# Patient Record
Sex: FEMALE | Race: WHITE | NOT HISPANIC OR LATINO | Employment: FULL TIME | URBAN - METROPOLITAN AREA
[De-identification: names, ages, dates, MRNs, and addresses within clinical notes are randomized per-mention and may not be internally consistent; named-entity substitution may affect disease eponyms.]

---

## 2017-02-24 ENCOUNTER — OFFICE VISIT (OUTPATIENT)
Dept: URGENT CARE | Facility: PHYSICIAN GROUP | Age: 27
End: 2017-02-24
Payer: COMMERCIAL

## 2017-02-24 VITALS
OXYGEN SATURATION: 95 % | RESPIRATION RATE: 16 BRPM | BODY MASS INDEX: 27.21 KG/M2 | DIASTOLIC BLOOD PRESSURE: 78 MMHG | TEMPERATURE: 98.2 F | WEIGHT: 135 LBS | HEIGHT: 59 IN | HEART RATE: 88 BPM | SYSTOLIC BLOOD PRESSURE: 116 MMHG

## 2017-02-24 DIAGNOSIS — J04.0 LARYNGITIS: ICD-10-CM

## 2017-02-24 DIAGNOSIS — R05.9 COUGH: ICD-10-CM

## 2017-02-24 PROCEDURE — 99204 OFFICE O/P NEW MOD 45 MIN: CPT | Performed by: PHYSICIAN ASSISTANT

## 2017-02-24 RX ORDER — BENZONATATE 100 MG/1
200 CAPSULE ORAL 3 TIMES DAILY PRN
Qty: 30 CAP | Refills: 0 | Status: ON HOLD | OUTPATIENT
Start: 2017-02-24 | End: 2018-02-23

## 2017-02-24 RX ORDER — METHYLPREDNISOLONE 4 MG/1
TABLET ORAL
Qty: 1 KIT | Refills: 0 | Status: ON HOLD | OUTPATIENT
Start: 2017-02-24 | End: 2018-02-23

## 2017-02-24 ASSESSMENT — ENCOUNTER SYMPTOMS
SHORTNESS OF BREATH: 0
NECK PAIN: 0
ABDOMINAL PAIN: 0
MYALGIAS: 0
WHEEZING: 1
EYE REDNESS: 0
HEADACHES: 1
COUGH: 1
SORE THROAT: 0
DIARRHEA: 0
EYE DISCHARGE: 0
CHILLS: 0
FEVER: 0
SPUTUM PRODUCTION: 0

## 2017-02-24 ASSESSMENT — PATIENT HEALTH QUESTIONNAIRE - PHQ9: CLINICAL INTERPRETATION OF PHQ2 SCORE: 0

## 2017-02-24 NOTE — PROGRESS NOTES
"Subjective:      Paola Covington is a 26 y.o. female who presents with Cough            Cough  This is a new problem. Episode onset: 6 days ago. The problem has been waxing and waning. The problem occurs hourly. The cough is non-productive. Associated symptoms include headaches and wheezing. Pertinent negatives include no chest pain, chills, ear congestion, ear pain, eye redness, fever, myalgias, nasal congestion, postnasal drip, rash, sore throat or shortness of breath. Associated symptoms comments: Pos. For voice changes.   . The symptoms are aggravated by cold air. Treatments tried: Tea. The treatment provided mild relief. Her past medical history is significant for asthma.   Pt. Reports that 3 days ago she began to have voice changes, and then lost it yesterday. She denies any soreness, only scratchy throat.     Review of Systems   Constitutional: Negative for fever, chills and malaise/fatigue.   HENT: Negative for congestion, ear pain, postnasal drip and sore throat.    Eyes: Negative for discharge and redness.   Respiratory: Positive for cough and wheezing. Negative for sputum production and shortness of breath.    Cardiovascular: Negative for chest pain and leg swelling.   Gastrointestinal: Negative for abdominal pain and diarrhea.   Genitourinary: Negative for dysuria and urgency.   Musculoskeletal: Negative for myalgias and neck pain.   Skin: Negative for itching and rash.   Neurological: Positive for headaches.          Objective:     /78 mmHg  Pulse 88  Temp(Src) 36.8 °C (98.2 °F)  Resp 16  Ht 1.499 m (4' 11.02\")  Wt 61.236 kg (135 lb)  BMI 27.25 kg/m2  SpO2 95%  LMP 02/17/2017  Breastfeeding? No   PMH:  has a past medical history of ASTHMA and Anxiety. She also has no past medical history of Addisons disease (CMS-[facility]), Adrenal disorder (CMS-[facility]), Allergy, Anemia, Arrhythmia, Arthritis, Blood transfusion, Cancer (CMS-[facility]), CATARACT, CHF (congestive heart failure) (CMS-HCC), Clotting " disorder (CMS-[facility]), COPD, Cushings syndrome (CMS-[facility]), Depression, Diabetes, Diabetic neuropathy (CMS-[facility]), EMPHYSEMA, GERD (gastroesophageal reflux disease), Glaucoma, Goiter, Headache(784.0), Heart attack (CMS-[facility]), Heart murmur, HIV (human immunodeficiency virus infection), Hyperlipidemia, Parathyroid disorder (CMS-[facility]), Hypertension, IBD (inflammatory bowel disease), Meningitis, Kidney disease, Migraine, Urinary tract infection, site not specified, Tuberculosis, Ulcer (CMS-[facility]), Thyroid disease, Substance abuse, Stroke (CMS-[facility]), Sickle cell disease (CMS-[facility]), Seizure (CMS-[facility]), Pituitary disease (CMS-[facility]), OSTEOPOROSIS, or Muscle disorder.  MEDS:   Current outpatient prescriptions:   •  MethylPREDNISolone (MEDROL DOSEPAK) 4 MG Tablet Therapy Pack, UAD, Disp: 1 Kit, Rfl: 0  •  benzonatate (TESSALON) 100 MG Cap, Take 2 Caps by mouth 3 times a day as needed for Cough., Disp: 30 Cap, Rfl: 0  •  benzonatate (TESSALON) 100 MG Cap, Take 2 Caps by mouth 3 times a day as needed for Cough., Disp: 30 Cap, Rfl: 0  •  MethylPREDNISolone (MEDROL DOSEPAK) 4 MG Tablet Therapy Pack, UAD, Disp: 1 Kit, Rfl: 0  •  ibuprofen (MOTRIN) 600 MG Tab, Take 1 Tab by mouth every 6 hours as needed for Moderate Pain., Disp: 30 Tab, Rfl: 0  •  acetaminophen (TYLENOL) 325 MG Tab, Take 325 mg by mouth 2 times a day as needed., Disp: , Rfl:   •  ferrous sulfate 325 (65 FE) MG tablet, Take 1 Tab by mouth 2 Times a Day., Disp: 60 Tab, Rfl: 0  •  Ferrous Gluconate 246 (28 FE) MG Tab, Take 2 tablet by mouth 2 Times a Day., Disp: 30 Tab, Rfl: 3  ALLERGIES: No Known Allergies  SURGHX:   Past Surgical History   Procedure Laterality Date   • Dilation and evacuation  7/16/2012     Performed by FLORI CHAN at SURGERY [facility]     SOCHX:  reports that she has never smoked. She does not have any smokeless tobacco history on file. She reports that she drinks alcohol. She reports that she does not use illicit drugs.  FH: Family history was  reviewed, no pertinent findings to report    Physical Exam   Constitutional: She is oriented to person, place, and time. She appears well-developed and well-nourished.   HENT:   Head: Normocephalic and atraumatic.   Right Ear: External ear normal.   Left Ear: External ear normal.   Nose: Nose normal.   Mouth/Throat: No oropharyngeal exudate.   Eyes: EOM are normal. Pupils are equal, round, and reactive to light.   Neck: Normal range of motion. Neck supple.   Cardiovascular: Normal rate and regular rhythm.    No murmur heard.  Pulmonary/Chest: Effort normal. She has wheezes.   Insp. Wheezing throughout.    Musculoskeletal: Normal range of motion. She exhibits no tenderness.   Lymphadenopathy:     She has no cervical adenopathy.   Neurological: She is alert and oriented to person, place, and time.   Skin: Skin is warm. No rash noted.   Psychiatric: She has a normal mood and affect. Her behavior is normal.   Vitals reviewed.              Assessment/Plan:     1. Laryngitis  2. Cough  - MethylPREDNISolone (MEDROL DOSEPAK) 4 MG Tablet Therapy Pack; UAD  Dispense: 1 Kit; Refill: 0  - benzonatate (TESSALON) 100 MG Cap; Take 2 Caps by mouth 3 times a day as needed for Cough.  Dispense: 30 Cap; Refill: 0    Pt declined albuterol today- she did not have significant improvement in the past with such. Will trial steroid to assist with wheezing and cough.   Discussed viral nature of symptoms today. Encouraged fluids, avoid night time dairy.   Patient given precautionary s/sx that mandate immediate follow up and evaluation in the ED. Advised of risks of not doing so.    DDX, Supportive care, and indications for immediate follow-up discussed with patient.    Instructed to return to clinic or nearest emergency department if we are not available for any change in condition, further concerns, or worsening of symptoms.    The patient demonstrated a good understanding and agreed with the treatment plan.

## 2017-02-24 NOTE — MR AVS SNAPSHOT
"        Paola Adria   2017 12:40 PM   Office Visit   MRN: [id number]    Department:  [facility]   Dept Phone:  [phone]    Description:  Female : 1990   Provider:  Dimitry Ponce PA-C           Reason for Visit     Cough x6 days, loss of voice x3 days      Allergies as of 2017     No Known Allergies      You were diagnosed with     Laryngitis   [453331]       Cough   [786.2.ICD-9-CM]         Vital Signs     Blood Pressure Pulse Temperature Respirations Height Weight    116/78 mmHg 88 36.8 °C (98.2 °F) 16 1.499 m (4' 11.02\") 61.236 kg (135 lb)    Body Mass Index Oxygen Saturation Last Menstrual Period Breastfeeding? Smoking Status       27.25 kg/m2 95% 2017 No Never Smoker        Basic Information     Date Of Birth Sex Race Ethnicity Preferred Language    1990 Female White Non- English      Problem List              ICD-10-CM Priority Class Noted - Resolved    Routine postpartum follow-up Z39.2   2012 - Present    Threatened  in second trimester O20.0   2016 - Present      Health Maintenance        Date Due Completion Dates    IMM HEP B VACCINE (1 of 3 - Primary Series) 1990 ---    IMM HEP A VACCINE (1 of 2 - Standard Series) 1991 ---    IMM HPV VACCINE (1 of 3 - Female 3 Dose Series) 2001 ---    IMM VARICELLA (CHICKENPOX) VACCINE (1 of 2 - 2 Dose Adolescent Series) 2003 ---    PAP SMEAR 2014    IMM INFLUENZA (1) 2016 ---    IMM DTaP/Tdap/Td Vaccine (2 - Td) 2022            Current Immunizations     Tdap Vaccine 2012  6:15 AM      Below and/or attached are the medications your provider expects you to take. Review all of your home medications and newly ordered medications with your provider and/or pharmacist. Follow medication instructions as directed by your provider and/or pharmacist. Please keep your medication list with you and share with your provider. Update the information when " medications are discontinued, doses are changed, or new medications (including over-the-counter products) are added; and carry medication information at all times in the event of emergency situations     Allergies:  No Known Allergies          Medications  Valid as of: February 24, 2017 -  1:26 PM    Generic Name Brand Name Tablet Size Instructions for use    Acetaminophen (Tab) TYLENOL 325 MG Take 325 mg by mouth 2 times a day as needed.        Benzonatate (Cap) TESSALON 100 MG Take 2 Caps by mouth 3 times a day as needed for Cough.        Benzonatate (Cap) TESSALON 100 MG Take 2 Caps by mouth 3 times a day as needed for Cough.        Ferrous Gluconate (Tab) Ferrous Gluconate 246 (28 FE) MG Take 2 tablet by mouth 2 Times a Day.        Ferrous Sulfate (Tab) ferrous sulfate 325 (65 FE) MG Take 1 Tab by mouth 2 Times a Day.        Ibuprofen (Tab) MOTRIN 600 MG Take 1 Tab by mouth every 6 hours as needed for Moderate Pain.        MethylPREDNISolone (Tablet Therapy Pack) MEDROL DOSEPAK 4 MG UAD        MethylPREDNISolone (Tablet Therapy Pack) MEDROL DOSEPAK 4 MG UAD        .                 Medicines prescribed today were sent to:     [facility] [address] [place] [id number]    Phone: [phone] Fax: [phone]    Open 24 Hours?: No      Medication refill instructions:       If your prescription bottle indicates you have medication refills left, it is not necessary to call your provider’s office. Please contact your pharmacy and they will refill your medication.    If your prescription bottle indicates you do not have any refills left, you may request refills at any time through one of the following ways: The online [employer] system (except Urgent Care), by calling your provider’s office, or by asking your pharmacy to contact your provider’s office with a refill request. Medication refills are processed only during regular business hours and may not be  available until the next business day. Your provider may request additional information or to have a follow-up visit with you prior to refilling your medication.   *Please Note: Medication refills are assigned a new Rx number when refilled electronically. Your pharmacy may indicate that no refills were authorized even though a new prescription for the same medication is available at the pharmacy. Please request the medicine by name with the pharmacy before contacting your provider for a refill.           [employer] Access Code: [email or web address]  Expires: 3/26/2017  1:16 PM    [employer]  A secure, online tool to manage your health information     [employer]® is a secure, online tool that connects you to your personalized health information from the privacy of your home -- day or night - making it very easy for you to manage your healthcare. Once the activation process is completed, you can even access your medical information using the [employer] neftali, which is available for free in the Apple Neftali store or Google Play store.     [employer] provides the following levels of access (as shown below):   My Chart Features   [facility] Doctor [facility]  Specialists [facility]  Urgent  Care Non-[facility]  Primary Care  Doctor   Email your healthcare team securely and privately 24/7 X X X    Manage appointments: schedule your next appointment; view details of past/upcoming appointments X      Request prescription refills. X      View recent personal medical records, including lab and immunizations X X X X   View health record, including health history, allergies, medications X X X X   Read reports about your outpatient visits, procedures, consult and ER notes X X X X   See your discharge summary, which is a recap of your hospital and/or ER visit that includes your diagnosis, lab results, and care plan. X X       How to register for [employer]:  1. Go to  [email or web address].  2. Click on the Sign Up Now box, which takes  you to the New Member Sign Up page. You will need to provide the following information:  a. Enter your [employer] Access Code exactly as it appears at the top of this page. (You will not need to use this code after you’ve completed the sign-up process. If you do not sign up before the expiration date, you must request a new code.)   b. Enter your date of birth.   c. Enter your home email address.   d. Click Submit, and follow the next screen’s instructions.  3. Create a [employer] ID. This will be your [employer] login ID and cannot be changed, so think of one that is secure and easy to remember.  4. Create a [employer] password. You can change your password at any time.  5. Enter your Password Reset Question and Answer. This can be used at a later time if you forget your password.   6. Enter your e-mail address. This allows you to receive e-mail notifications when new information is available in [employer].  7. Click Sign Up. You can now view your health information.    For assistance activating your [employer] account, call [phone]

## 2017-02-24 NOTE — Clinical Note
February 24, 2017         Patient: Paola Covington   YOB: 1990   Date of Visit: 2/24/2017           To Whom it May Concern:    Paola Covington was seen in my clinic on 2/24/2017. Please excuse this patient from work today and tomorrow due to recent illness.    If you have any questions or concerns, please don't hesitate to call.        Sincerely,           Dimitry Ponce PA-C  Electronically Signed

## 2017-09-19 ENCOUNTER — HOSPITAL ENCOUNTER (OUTPATIENT)
Dept: LAB | Facility: MEDICAL CENTER | Age: 27
End: 2017-09-19
Attending: FAMILY MEDICINE
Payer: COMMERCIAL

## 2017-09-19 LAB — HCG SERPL QL: NEGATIVE

## 2017-09-19 PROCEDURE — 36415 COLL VENOUS BLD VENIPUNCTURE: CPT

## 2017-09-19 PROCEDURE — 84703 CHORIONIC GONADOTROPIN ASSAY: CPT

## 2018-01-24 ENCOUNTER — HOSPITAL ENCOUNTER (OUTPATIENT)
Dept: LAB | Facility: MEDICAL CENTER | Age: 28
End: 2018-01-24
Attending: SPECIALIST
Payer: COMMERCIAL

## 2018-01-24 LAB
ABO GROUP BLD: NORMAL
BASOPHILS # BLD AUTO: 0.8 % (ref 0–1.8)
BASOPHILS # BLD: 0.07 K/UL (ref 0–0.12)
BLD GP AB SCN SERPL QL: NORMAL
EOSINOPHIL # BLD AUTO: 0.2 K/UL (ref 0–0.51)
EOSINOPHIL NFR BLD: 2.3 % (ref 0–6.9)
ERYTHROCYTE [DISTWIDTH] IN BLOOD BY AUTOMATED COUNT: 42.3 FL (ref 35.9–50)
HBV SURFACE AG SER QL: NEGATIVE
HCT VFR BLD AUTO: 41.1 % (ref 37–47)
HCV AB SER QL: NEGATIVE
HGB BLD-MCNC: 13.9 G/DL (ref 12–16)
HIV 1+2 AB+HIV1 P24 AG SERPL QL IA: NON REACTIVE
IMM GRANULOCYTES # BLD AUTO: 0.04 K/UL (ref 0–0.11)
IMM GRANULOCYTES NFR BLD AUTO: 0.5 % (ref 0–0.9)
LYMPHOCYTES # BLD AUTO: 1.9 K/UL (ref 1–4.8)
LYMPHOCYTES NFR BLD: 22 % (ref 22–41)
MCH RBC QN AUTO: 30.3 PG (ref 27–33)
MCHC RBC AUTO-ENTMCNC: 33.8 G/DL (ref 33.6–35)
MCV RBC AUTO: 89.5 FL (ref 81.4–97.8)
MONOCYTES # BLD AUTO: 0.85 K/UL (ref 0–0.85)
MONOCYTES NFR BLD AUTO: 9.8 % (ref 0–13.4)
NEUTROPHILS # BLD AUTO: 5.57 K/UL (ref 2–7.15)
NEUTROPHILS NFR BLD: 64.6 % (ref 44–72)
NRBC # BLD AUTO: 0 K/UL
NRBC BLD-RTO: 0 /100 WBC
PLATELET # BLD AUTO: 262 K/UL (ref 164–446)
PMV BLD AUTO: 11 FL (ref 9–12.9)
RBC # BLD AUTO: 4.59 M/UL (ref 4.2–5.4)
RH BLD: NORMAL
RUBV AB SER QL: 491.8 IU/ML
TREPONEMA PALLIDUM IGG+IGM AB [PRESENCE] IN SERUM OR PLASMA BY IMMUNOASSAY: NON REACTIVE
WBC # BLD AUTO: 8.6 K/UL (ref 4.8–10.8)

## 2018-01-24 PROCEDURE — 36415 COLL VENOUS BLD VENIPUNCTURE: CPT

## 2018-01-24 PROCEDURE — 86762 RUBELLA ANTIBODY: CPT

## 2018-01-24 PROCEDURE — 87389 HIV-1 AG W/HIV-1&-2 AB AG IA: CPT

## 2018-01-24 PROCEDURE — 86850 RBC ANTIBODY SCREEN: CPT

## 2018-01-24 PROCEDURE — 85025 COMPLETE CBC W/AUTO DIFF WBC: CPT

## 2018-01-24 PROCEDURE — 86900 BLOOD TYPING SEROLOGIC ABO: CPT

## 2018-01-24 PROCEDURE — 86901 BLOOD TYPING SEROLOGIC RH(D): CPT

## 2018-01-24 PROCEDURE — 87340 HEPATITIS B SURFACE AG IA: CPT

## 2018-01-24 PROCEDURE — 86803 HEPATITIS C AB TEST: CPT

## 2018-01-24 PROCEDURE — 86780 TREPONEMA PALLIDUM: CPT

## 2018-02-20 ENCOUNTER — HOSPITAL ENCOUNTER (OUTPATIENT)
Dept: RADIOLOGY | Facility: MEDICAL CENTER | Age: 28
End: 2018-02-20
Attending: SPECIALIST
Payer: COMMERCIAL

## 2018-02-20 DIAGNOSIS — O36.4XX0: ICD-10-CM

## 2018-02-20 PROCEDURE — 76805 OB US >/= 14 WKS SNGL FETUS: CPT

## 2018-02-23 ENCOUNTER — HOSPITAL ENCOUNTER (INPATIENT)
Facility: MEDICAL CENTER | Age: 28
LOS: 1 days | DRG: 770 | End: 2018-02-24
Attending: SPECIALIST | Admitting: SPECIALIST
Payer: COMMERCIAL

## 2018-02-23 LAB
BASOPHILS # BLD AUTO: 0.7 % (ref 0–1.8)
BASOPHILS # BLD: 0.06 K/UL (ref 0–0.12)
EOSINOPHIL # BLD AUTO: 0.32 K/UL (ref 0–0.51)
EOSINOPHIL NFR BLD: 3.6 % (ref 0–6.9)
ERYTHROCYTE [DISTWIDTH] IN BLOOD BY AUTOMATED COUNT: 41.4 FL (ref 35.9–50)
HCT VFR BLD AUTO: 41.8 % (ref 37–47)
HGB BLD-MCNC: 14.4 G/DL (ref 12–16)
HOLDING TUBE BB 8507: NORMAL
IMM GRANULOCYTES # BLD AUTO: 0.06 K/UL (ref 0–0.11)
IMM GRANULOCYTES NFR BLD AUTO: 0.7 % (ref 0–0.9)
LYMPHOCYTES # BLD AUTO: 2.12 K/UL (ref 1–4.8)
LYMPHOCYTES NFR BLD: 24 % (ref 22–41)
MCH RBC QN AUTO: 30.4 PG (ref 27–33)
MCHC RBC AUTO-ENTMCNC: 34.4 G/DL (ref 33.6–35)
MCV RBC AUTO: 88.2 FL (ref 81.4–97.8)
MONOCYTES # BLD AUTO: 0.71 K/UL (ref 0–0.85)
MONOCYTES NFR BLD AUTO: 8 % (ref 0–13.4)
NEUTROPHILS # BLD AUTO: 5.57 K/UL (ref 2–7.15)
NEUTROPHILS NFR BLD: 63 % (ref 44–72)
NRBC # BLD AUTO: 0 K/UL
NRBC BLD-RTO: 0 /100 WBC
PLATELET # BLD AUTO: 274 K/UL (ref 164–446)
PMV BLD AUTO: 10.8 FL (ref 9–12.9)
RBC # BLD AUTO: 4.74 M/UL (ref 4.2–5.4)
WBC # BLD AUTO: 8.8 K/UL (ref 4.8–10.8)

## 2018-02-23 PROCEDURE — 85025 COMPLETE CBC W/AUTO DIFF WBC: CPT

## 2018-02-23 PROCEDURE — 305385 HCHG SURGICAL SERVICES 1/4 HOUR: Performed by: SPECIALIST

## 2018-02-23 PROCEDURE — 59160 D & C AFTER DELIVERY: CPT

## 2018-02-23 PROCEDURE — 304964 HCHG RECOVERY ROOM TIME 1HR: Performed by: SPECIALIST

## 2018-02-23 PROCEDURE — 700105 HCHG RX REV CODE 258

## 2018-02-23 PROCEDURE — A9270 NON-COVERED ITEM OR SERVICE: HCPCS | Performed by: SPECIALIST

## 2018-02-23 PROCEDURE — 304965 HCHG RECOVERY SERVICES

## 2018-02-23 PROCEDURE — 700111 HCHG RX REV CODE 636 W/ 250 OVERRIDE (IP)

## 2018-02-23 PROCEDURE — 306828 HCHG ANES-TIME GENERAL: Performed by: SPECIALIST

## 2018-02-23 PROCEDURE — 304966 HCHG RECOVERY SVSC TIME ADDL 1/2 HR: Performed by: SPECIALIST

## 2018-02-23 PROCEDURE — 700102 HCHG RX REV CODE 250 W/ 637 OVERRIDE(OP): Performed by: SPECIALIST

## 2018-02-23 PROCEDURE — 770002 HCHG ROOM/CARE - OB PRIVATE (112)

## 2018-02-23 PROCEDURE — 36415 COLL VENOUS BLD VENIPUNCTURE: CPT

## 2018-02-23 PROCEDURE — 10D17ZZ EXTRACTION OF PRODUCTS OF CONCEPTION, RETAINED, VIA NATURAL OR ARTIFICIAL OPENING: ICD-10-PCS | Performed by: SPECIALIST

## 2018-02-23 PROCEDURE — 59409 OBSTETRICAL CARE: CPT

## 2018-02-23 PROCEDURE — 700111 HCHG RX REV CODE 636 W/ 250 OVERRIDE (IP): Performed by: SPECIALIST

## 2018-02-23 RX ORDER — ONDANSETRON 2 MG/ML
4 INJECTION INTRAMUSCULAR; INTRAVENOUS EVERY 6 HOURS PRN
OUTPATIENT
Start: 2018-02-23

## 2018-02-23 RX ORDER — SODIUM CHLORIDE, SODIUM LACTATE, POTASSIUM CHLORIDE, CALCIUM CHLORIDE 600; 310; 30; 20 MG/100ML; MG/100ML; MG/100ML; MG/100ML
INJECTION, SOLUTION INTRAVENOUS
Status: COMPLETED
Start: 2018-02-23 | End: 2018-02-23

## 2018-02-23 RX ORDER — MISOPROSTOL 200 UG/1
400 TABLET ORAL 4 TIMES DAILY
Status: DISCONTINUED | OUTPATIENT
Start: 2018-02-23 | End: 2018-02-24 | Stop reason: HOSPADM

## 2018-02-23 RX ORDER — METHYLERGONOVINE MALEATE 0.2 MG/ML
0.2 INJECTION INTRAVENOUS
Status: CANCELLED | OUTPATIENT
Start: 2018-02-23

## 2018-02-23 RX ORDER — ONDANSETRON 2 MG/ML
4 INJECTION INTRAMUSCULAR; INTRAVENOUS EVERY 4 HOURS PRN
Status: DISCONTINUED | OUTPATIENT
Start: 2018-02-23 | End: 2018-02-24 | Stop reason: HOSPADM

## 2018-02-23 RX ORDER — OXYCODONE HYDROCHLORIDE AND ACETAMINOPHEN 5; 325 MG/1; MG/1
2 TABLET ORAL EVERY 4 HOURS PRN
Status: CANCELLED | OUTPATIENT
Start: 2018-02-23

## 2018-02-23 RX ORDER — SODIUM CHLORIDE, SODIUM LACTATE, POTASSIUM CHLORIDE, CALCIUM CHLORIDE 600; 310; 30; 20 MG/100ML; MG/100ML; MG/100ML; MG/100ML
INJECTION, SOLUTION INTRAVENOUS PRN
Status: CANCELLED | OUTPATIENT
Start: 2018-02-23

## 2018-02-23 RX ORDER — OXYCODONE HYDROCHLORIDE AND ACETAMINOPHEN 5; 325 MG/1; MG/1
1 TABLET ORAL EVERY 4 HOURS PRN
Status: CANCELLED | OUTPATIENT
Start: 2018-02-23

## 2018-02-23 RX ORDER — IBUPROFEN 600 MG/1
600 TABLET ORAL EVERY 6 HOURS PRN
Status: CANCELLED | OUTPATIENT
Start: 2018-02-23

## 2018-02-23 RX ORDER — DOCUSATE SODIUM 100 MG/1
100 CAPSULE, LIQUID FILLED ORAL 2 TIMES DAILY PRN
Status: CANCELLED | OUTPATIENT
Start: 2018-02-23

## 2018-02-23 RX ORDER — ONDANSETRON 4 MG/1
4 TABLET, ORALLY DISINTEGRATING ORAL EVERY 6 HOURS PRN
OUTPATIENT
Start: 2018-02-23

## 2018-02-23 RX ADMIN — MISOPROSTOL 400 MCG: 200 TABLET ORAL at 12:16

## 2018-02-23 RX ADMIN — ONDANSETRON HYDROCHLORIDE 4 MG: 2 INJECTION, SOLUTION INTRAMUSCULAR; INTRAVENOUS at 20:35

## 2018-02-23 RX ADMIN — SODIUM CHLORIDE, POTASSIUM CHLORIDE, SODIUM LACTATE AND CALCIUM CHLORIDE 1000 ML: 600; 310; 30; 20 INJECTION, SOLUTION INTRAVENOUS at 21:00

## 2018-02-23 RX ADMIN — MISOPROSTOL 400 MCG: 200 TABLET ORAL at 16:47

## 2018-02-23 RX ADMIN — FENTANYL CITRATE 100 MCG: 50 INJECTION, SOLUTION INTRAMUSCULAR; INTRAVENOUS at 20:24

## 2018-02-23 ASSESSMENT — PAIN SCALES - GENERAL
PAINLEVEL_OUTOF10: 0
PAINLEVEL_OUTOF10: 1
PAINLEVEL_OUTOF10: 0
PAINLEVEL_OUTOF10: 0
PAINLEVEL_OUTOF10: 1
PAINLEVEL_OUTOF10: 0
PAINLEVEL_OUTOF10: 6

## 2018-02-23 ASSESSMENT — LIFESTYLE VARIABLES
ALCOHOL_USE: NO
EVER_SMOKED: NEVER
DO YOU DRINK ALCOHOL: NO

## 2018-02-23 NOTE — CARE PLAN
Problem: Pain  Goal: Alleviation of Pain or a reduction in pain to the patient's comfort goal    Intervention: Pain Management - Epidural/Spinal  Pt will receive epidural if requested      Problem: Risk for Infection, Impaired Wound Healing  Goal: Remain free from signs and symptoms of infection  Outcome: PROGRESSING AS EXPECTED  Pt will remain free from s/s of infection

## 2018-02-23 NOTE — PROGRESS NOTES
0915 pt presents to floor for IOL for feta demise-unknown cause. Pt promptly walked to room. Pt has hx of previous fetal demise in June 2016. FOB, Duane, at bedside. Pt stable and alert.     1045 MD notified for IOL orders and diet. Left detailed voicemail.    1050 MD returned call. received orders for 400mcg cytotec vaginally; MD will be at bedside around lunch time to reassess pt. Orders for regular diet. Pt updated on POC.    1210 Charge DIONTE HERNANDEZ, at bedside for cytotec placement. All questions answered. Pt to remain in bed for 1 hour. Pt can receive pain medication as needed.    1315 pt notified that she can now sit up and use the rest room as needed.    1415 MD at bedside to assess pt. Pt stable and alert. Pt reports 'a little bit of cramping'. Will perform SVE after 4 hours of placement. Pt understands.    1515 pastoral services notified. Detailed voicemail left.    1520 father Stanley notified for Mandae[facility] services. RN left detailed voicemail.    1620 SVE 1/thick.    1625 MD notified of pt status. Orders to administer an additional dose of 400mcg cytotec vaginally. Pt updated on POC.    1645 Charge DIONTE HERNANDEZ, at bedside for cytotec placement.     1900 bedside report to Noc ILDEFONSO HERNANDEZ. Pt stable and alert, resting in bed. FOB at bedside. All questions answered.

## 2018-02-24 VITALS
DIASTOLIC BLOOD PRESSURE: 67 MMHG | HEIGHT: 59 IN | WEIGHT: 149 LBS | HEART RATE: 82 BPM | SYSTOLIC BLOOD PRESSURE: 119 MMHG | RESPIRATION RATE: 16 BRPM | TEMPERATURE: 97.5 F | BODY MASS INDEX: 30.04 KG/M2

## 2018-02-24 PROBLEM — O02.1 FETAL DEMISE BEFORE 20 WEEKS WITH RETENTION OF DEAD FETUS: Status: ACTIVE | Noted: 2018-02-24

## 2018-02-24 LAB
BASOPHILS # BLD AUTO: 0.3 % (ref 0–1.8)
BASOPHILS # BLD: 0.03 K/UL (ref 0–0.12)
EOSINOPHIL # BLD AUTO: 0.34 K/UL (ref 0–0.51)
EOSINOPHIL NFR BLD: 3.7 % (ref 0–6.9)
ERYTHROCYTE [DISTWIDTH] IN BLOOD BY AUTOMATED COUNT: 41.3 FL (ref 35.9–50)
HCT VFR BLD AUTO: 34.8 % (ref 37–47)
HGB BLD-MCNC: 11.9 G/DL (ref 12–16)
IMM GRANULOCYTES # BLD AUTO: 0.06 K/UL (ref 0–0.11)
IMM GRANULOCYTES NFR BLD AUTO: 0.7 % (ref 0–0.9)
LYMPHOCYTES # BLD AUTO: 2.25 K/UL (ref 1–4.8)
LYMPHOCYTES NFR BLD: 24.8 % (ref 22–41)
MCH RBC QN AUTO: 30.2 PG (ref 27–33)
MCHC RBC AUTO-ENTMCNC: 34.2 G/DL (ref 33.6–35)
MCV RBC AUTO: 88.3 FL (ref 81.4–97.8)
MONOCYTES # BLD AUTO: 0.82 K/UL (ref 0–0.85)
MONOCYTES NFR BLD AUTO: 9 % (ref 0–13.4)
NEUTROPHILS # BLD AUTO: 5.58 K/UL (ref 2–7.15)
NEUTROPHILS NFR BLD: 61.5 % (ref 44–72)
NRBC # BLD AUTO: 0 K/UL
NRBC BLD-RTO: 0 /100 WBC
PLATELET # BLD AUTO: 233 K/UL (ref 164–446)
PMV BLD AUTO: 10.3 FL (ref 9–12.9)
RBC # BLD AUTO: 3.94 M/UL (ref 4.2–5.4)
WBC # BLD AUTO: 9.1 K/UL (ref 4.8–10.8)

## 2018-02-24 PROCEDURE — 85025 COMPLETE CBC W/AUTO DIFF WBC: CPT

## 2018-02-24 PROCEDURE — 36415 COLL VENOUS BLD VENIPUNCTURE: CPT

## 2018-02-24 PROCEDURE — 88300 SURGICAL PATH GROSS: CPT

## 2018-02-24 PROCEDURE — 88305 TISSUE EXAM BY PATHOLOGIST: CPT

## 2018-02-24 NOTE — PROGRESS NOTES
The patient is a very pleasant 27 year old primipara (para 1, with one previous vaginal delivery) admitted today for induction of labor following fetal demise at about 14 to 15 weeks gestation. Please see dictated H&P. Cytotec 400 micrograms was given per vagina earlier today.   Sonny Monroe M.D.

## 2018-02-24 NOTE — H&P
DATE OF ADMISSION:  02/23/2018    IDENTIFICATION:  The patient is a very pleasant 27-year-old primipara (para 1   and she has had 1 previous vaginal delivery).    CHIEF COMPLAINT:  The patient has had some pelvic cramping pain.    HISTORY OF PRESENT ILLNESS:  I saw the patient in my office on 1/24/2018 and   this was her first visit with me and at that time, namely on 1/24/2018, I   performed a transvaginal pelvic ultrasound (the patient had presented with   amenorrhea) and this transvaginal pelvic ultrasound revealed a live cedillo   intrauterine pregnancy and obvious fetal movement and obvious fetal cardiac   activity at that time was seen on transvaginal ultrasound and the larger and   more accurate crown rump length measurement was 7.07 cm, which corresponded   to a gestational age of 13 weeks and 2 days gestation.  When she returned to see   me in the office almost 4 weeks later, on Tuesday 2/20/2018, fetal heart   tones could not be auscultated with the Doppler and so a transabdominal   ultrasound was performed which revealed a cedillo intrauterine gestation   with a demised fetus.  No fetal movement and no fetal cardiac activity were   seen.  I shared the results with the patient and had a long discussion with   her about the subject of fetal demise.  She was sent to radiology for a   confirmatory ultrasound, and I received a telephone call from the radiologist   at about 6:17 p.m. that day.  The report revealed intrauterine fetal demise   with an estimated gestational age average of 14 weeks and 5 days gestation.    I discussed with the patient options.  I discussed with her the option of   dilation and evacuation and I also discussed with her the option of admitting   her to labor and delivery for administration of Cytotec intravaginally in   order to induce labor and accomplish vaginal delivery.  After discussing what   each of these aforementioned options involved and the risks and benefits and    alternatives of each of these aforementioned options, the patient said that   she wanted to be admitted to labor and delivery for induction of labor.    She was admitted today and given Cytotec 400 mcg per vagina.  She is   now having some mild cramping pain.  She says she feels fine otherwise and has  no other problems or complaints.    PAST MEDICAL HISTORY:  The patient says she has a history of asthma and heart   murmur.  She has had no recent problems with her asthma.  She says she no   other medical illnesses.    PAST SURGICAL HISTORY: She had intrauterine curettage performed by Dr. Roca on Kellie 3, 2016.  On 2012 she had on postpartum day #6 following term vaginal delivery a delayed postpartum hemorrhage and had an intrauterine curettage at that time.    MEDICATIONS:  The patient says she has been on no medications recently other   than for vitamins.    ALLERGIES:  THE PATIENT SAYS SHE HAS NO KNOWN DRUG ALLERGIES.    SOCIAL HISTORY:  The patient denies smoking.  She denies consuming alcoholic   beverages.  She denies use of recreational drugs.    PAST OB/GYN HISTORY:  The patient has had 1 previous vaginal delivery and her   son is 5 years old.  The patient did have uterine curettage performed by Dr. Trevon Roca on 2016 after she had had a miscarriage at about 16 weeks'   gestation.  There were some retained placental fragments.    The patient of note also had had uterine curettage on 2012 on postpartum   day #6 when she had delayed postpartum hemorrhage and ultrasound showed   retained products of conception.  She had had a vaginal delivery on 7/10/2012   and was delivered of a live male  with Apgar scores of 8 and 9 at 37   weeks and 4 days gestation.    REVIEW OF SYSTEMS:  GENERAL:  No fevers or chills or sweats.  PULMONARY:  No coughing or wheezing or chest pain, shortness of breath.  CARDIOVASCULAR:  No palpitations or dyspnea or chest pain.  GASTROINTESTINAL:  No nausea,  vomiting, diarrhea, constipation.  GENITOURINARY:  The patient has some mild cramping pain.  She denies vaginal   bleeding.  She denies any dysuria or hematuria.  MUSCULOSKELETAL:  No arthralgias or myalgias.  NEUROLOGIC:  No headaches or syncope or seizures.    PHYSICAL EXAMINATION:  VITAL SIGNS:  The patient's vital signs are stable and she is afebrile.  Her   temperature on admission was 97.3 degrees and her heart rate was 75 beats per   minute and respiration rate 18 breaths per minute, and blood pressure 116/73.  GENERAL:  The patient appears well-developed and well-nourished and relaxed   and alert and comfortable and in no apparent distress.  HEENT:  Normocephalic, atraumatic.  Pupils are equal, round, reactive to light   and accommodation.  Extraocular motions intact.  Pharynx is clear.  NECK:  There is no thyromegaly.  There is no cervical lymphadenopathy.  CHEST AND HEART:  Regular rate and rhythm.  No murmur.  LUNGS:  Clear to auscultation bilaterally.  ABDOMEN:  The abdomen is soft and nontender, and nondistended.  PELVIC:  Speculum exam, there are no vulvar, vaginal or cervical lesions and   there is no blood in the vault.  EXTREMITIES:  No clubbing, cyanosis or edema.  NEUROLOGICAL:  Nonfocal.    ASSESSMENT:  Fetal demise at about 14-15 weeks' gestation.  The patient should   be about 17-18 weeks gestation at this time.    PLAN:  The patient has been admitted and was given a dose of Cytotec 400 mcg   intravaginally.  We will repeat this every few hours to every several hours   and anticipate vaginal delivery.  I have discussed this with the patient in   length and she agrees with this plan.  We will give analgesia as needed.       ____________________________________     MARTHA ALVA MD    MED / NTS    DD:  02/23/2018 17:41:19  DT:  02/23/2018 20:08:29    D#:  [id number]  Job#:  [id number]

## 2018-02-24 NOTE — OR SURGEON
Immediate Post OP Note    PreOp Diagnosis:   Retained placenta within the intrauterine cavity following delivery of demised 14-15 week fetus.    PostOp Diagnosis:   Retained placenta within the intrauterine cavity following delivery of demised 14-15 week fetus.    PROCEDURE:  Suction D&E    Surgeon:  Sonny Monroe M.D.    Anesthesiologist/Type of Anesthesia:  Dashawn Chandler M.D. / Spinal anesthesia    Anesthesia staff cannot be found from this context./Suction D&E  Surgical Staff:  * Surgery not found *    Specimens:  Placenta    Estimated Blood Loss:   About 100 cc's     Findings:   Placental tissue is found within the intrauterine cavity.  After removal of placental tissue from the intrauterine cavity the entire intrauterine cavity is palpated and no tissue is left in the intrauterine cavity.    Complications:   None        2/23/2018 10:09 PM Sonny Monroe

## 2018-02-24 NOTE — PROGRESS NOTES
Late Entry    1900 Assumed care, PT stated she is feeling cramping but very mild.    1940 PT called out, stating that she is nauseated.    1945 Call to MD for nausea meds.     1955 @ BS to give meds, PT stated she is not nauseated but she is now feeling cramps more and wants to be checked.  SVE per doc flow.     2016 PT called out, felt gush.  R/bloody SVE per doc flow.    2020 Status update to Fer, he will come see PT.    2030 PT called out and requested nausea meds.     2046 PT called out. Stating that she vomited and felt something come out. PT delivered fetus in bed. Fer called @BS. Per MD 200ml EBL    Per MD, PT had retained placental and consented for D&E     2100 PT requesting time with baby. Fer notified and okay to wait to take PT to OR.    2125 PT called out, stated she felt something come out, blood and tissue visualized, moderate bleeding.  Call for Fer to assess.     2130 MD at BS to assess for placenta. Per MD placenta still not fully detaching. Will go to OR for D&E.     2140 In OR. Placenta and small amount of tissue was removed. MD foss cathed PT during procedure.    2212 Out of OR. Report from Ramesh,     Scant lochia, firm uterus.     0130 PT called out requesting to get up to bathroom. PT stated she is feeling weak and numbness in legs. PT placed on bed pan. Mando waiting to see PT    0230 PT up to bathroom with SBA. Denies dizziness or HA, light red bleeding.     0430 PT sleeping, no distress noted.     0610 Per PT she was up to bathroom, minimal bleeding noted on pad. Fundus firm.    0700 report to Nilam COREY

## 2018-02-24 NOTE — PROGRESS NOTES
0700 report from Noc RN, ILDEFONSO Falk. Pt and FOB sleeping. Pt woken up to share momentos created by Keturah. Fetus at bedside; pt to notify RN when she is ready to say goodbye. POC to get length and weight and take baby to [facility]. Pt will continue to rest and likely leave this afternoon.    0845 RN received phone call from MD. Orders to repeat CBC before pt cleared for DC. MD will be at bedside shortly to assess pt and give DC orders.    1100 MD at bedside. Pt stable and alert, ready to leave. Discharge orders received.    1200 transport on floor to take fetal demise down to [facility]. Given paperwork and box with demise. All questions answered.    1230 discharge instructions given. All questions answered. Pt and FOB have all belongings and understand to attend follow up appt with MD.    1235 pt off floor stable and alert with steady gait.    1330 NAM notified of FD being sent to [facility] and [facility] as preferred location.

## 2018-02-24 NOTE — OP REPORT
DATE OF SERVICE:  02/23/2018    PREOPERATIVE DIAGNOSIS:  Retained placenta within the intrauterine cavity   following delivery of demised 14-15 week fetus.    POSTOPERATIVE DIAGNOSIS:  Retained placenta within the intrauterine cavity   following delivery of demised 14-15 week fetus.    PROCEDURE:  Suction D and E.    SURGEON:  Sonny Monroe MD    ANESTHESIA:  Spinal anesthesia.    ANESTHESIOLOGIST:  Dashawn Chandler MD    FINDINGS:  Placental tissue was found within the intrauterine cavity.  After   removal of placental tissue from the intrauterine cavity, the intrauterine   cavity is palpated with the 's finger and it is noted that there is   now no more tissue left within the intrauterine cavity.    SPECIMENS:  Placenta.    COMPLICATIONS:  None.    ESTIMATED BLOOD LOSS:  Approximately 100 mL    DESCRIPTION OF PROCEDURE:  After the appropriate consents have been obtained,   the patient was taken to the operating room and given spinal anesthesia.  She   is prepped and draped in the dorsal lithotomy position.  A weighted speculum   is placed in the posterior fornix of the vagina and right angle retractor is   used to elevate the bladder and expose the cervix.  There is some placenta   coming from the cervix and this is grasped and removed and submitted as a   specimen.  The anterior aspect of the cervix is grasped with a ring forceps.    The largest available Hegar dilator is easily inserted all the way through the   cervix to the internal cervical os with no resistance.  The 16 Vacurette   suction tube is inserted through the endocervical canal into the intrauterine   cavity and all 4 quadrants of the intrauterine cavity are curetted with   suction curettage and tissue is procured from the intrauterine cavity with   suction curettage.  The Vacurette is removed.  A large sharp curet is   introduced into the intrauterine cavity and all 4 quadrants of the   intrauterine cavity are thoroughly curetted and a small  amount of placental   tissue is procured this way.  Sharp curettage is continued and no more tissue   can be procured from the intrauterine cavity.  The sharp curette is removed   and the Vacurette is reinserted into the intrauterine cavity and suction   curettage is performed and at this time, no more tissue can be procured from   the intrauterine cavity with suction curettage.  The Vacurette is removed.    The cervix is examined and no bleeding is seen coming from the cervix.  The   ring forceps is removed from the cervix.  The weighted speculum is removed.    Bimanual vaginal exam is performed and the 's fingers inserted into   the intrauterine cavity and with the pressure placed abdominally on the   uterine fundus, with the other hand, the most fundal portion of the   intrauterine cavity can be palpated and it is verified that there is now no   more tissue within the intrauterine cavity.  A speculum exam is performed and   the cervix is once again visualized and no bleeding is seen coming from the   cervix.  The speculum is removed.  The procedure is terminated.  The patient   tolerated the procedure well and sent to postanesthesia recovery in stable   condition.       ____________________________________     MARTHA ALVA MD    MED / NTS    DD:  02/23/2018 22:20:27  DT:  02/23/2018 22:56:38    D#:  [id number]  Job#:  [id number]    cc: WILLIAMS BENJAMIN MD

## 2018-02-24 NOTE — PROGRESS NOTES
The patient is today post op day # 1 status post suction D&C for retained placenta.   She tells me this morning that her vaginal bleeding is light.   She says that she has some pelvic cramping pain.   She says that she feels fine otherwise and that she has no other problems or complaints.   She says that she would like to go home today.   We will discharge home today.   I asked her to follow up with me in the office in a few weeks and to call or contact me at any time should she ever have any problems or complaints and she said that she would do so.  Sonny Monroe M.D.

## 2018-02-24 NOTE — DISCHARGE PLANNING
Medical Social Work     NITESH received a page from the pt RN requesting a mando for the pt. NITESH called the mando on call and advised him that the pt is requesting his services. The Mando stated he would be arriving at the [facility] within 30 min. Nitesh called and notified the bedside RN of the Mando arriving soon.     Plan: NITESH will remain available for pt a and family support.

## 2018-02-24 NOTE — CARE PLAN
Problem: Knowledge Deficit  Goal: Patient/Support person demonstrates understanding regarding the progression of labor, available options and participates in decision-making process  POC discussed, questions and concerns addressed.     Problem: Safety  Goal: Will remain free from injury  PT up self, steady on feet. Calls appropriately for assistance.

## 2018-02-24 NOTE — PROGRESS NOTES
The patient has received 2 doses of Cytotec. She received 400 µg of Cytotec per vagina at about 10:50 AM. She received a 2nd dose of Cytotec per vagina at 4:25 PM. She just delivered a demised approximate 14 week size fetus. The placenta is still within the uterus. The cervix is about 3 cm dilated. We are bringing her to the operating room for a suction D&C for removal of placenta.  I have discussed with her in detail and at length what suction D&C is and what suction D&C involves and discussed with her the risks and benefits and alternatives and she replied that she would like for us to proceed with suction D&C.  Sonny oMnroe M.D.

## 2018-03-13 ENCOUNTER — HOSPITAL ENCOUNTER (OUTPATIENT)
Dept: LAB | Facility: MEDICAL CENTER | Age: 28
End: 2018-03-13
Attending: SPECIALIST
Payer: COMMERCIAL

## 2018-03-13 LAB
T4 FREE SERPL-MCNC: 0.93 NG/DL (ref 0.53–1.43)
TSH SERPL DL<=0.005 MIU/L-ACNC: 0.82 UIU/ML (ref 0.38–5.33)

## 2018-03-13 PROCEDURE — 86147 CARDIOLIPIN ANTIBODY EA IG: CPT | Mod: 91

## 2018-03-13 PROCEDURE — 84439 ASSAY OF FREE THYROXINE: CPT

## 2018-03-13 PROCEDURE — 36415 COLL VENOUS BLD VENIPUNCTURE: CPT

## 2018-03-13 PROCEDURE — 86038 ANTINUCLEAR ANTIBODIES: CPT

## 2018-03-13 PROCEDURE — 84443 ASSAY THYROID STIM HORMONE: CPT

## 2018-03-15 LAB
CARDIOLIPIN IGG SER IA-ACNC: 2 GPL (ref 0–14)
CARDIOLIPIN IGM SER IA-ACNC: 8 MPL (ref 0–12)
NUCLEAR IGG SER QL IA: NORMAL

## 2018-05-06 ENCOUNTER — APPOINTMENT (OUTPATIENT)
Dept: RADIOLOGY | Facility: MEDICAL CENTER | Age: 28
End: 2018-05-06
Attending: EMERGENCY MEDICINE
Payer: COMMERCIAL

## 2018-05-06 ENCOUNTER — HOSPITAL ENCOUNTER (EMERGENCY)
Facility: MEDICAL CENTER | Age: 28
End: 2018-05-06
Attending: EMERGENCY MEDICINE
Payer: COMMERCIAL

## 2018-05-06 VITALS
SYSTOLIC BLOOD PRESSURE: 144 MMHG | OXYGEN SATURATION: 100 % | RESPIRATION RATE: 16 BRPM | DIASTOLIC BLOOD PRESSURE: 79 MMHG | BODY MASS INDEX: 30.89 KG/M2 | WEIGHT: 153.22 LBS | HEART RATE: 69 BPM | TEMPERATURE: 99.1 F | HEIGHT: 59 IN

## 2018-05-06 DIAGNOSIS — R10.13 EPIGASTRIC PAIN: ICD-10-CM

## 2018-05-06 LAB
ALBUMIN SERPL BCP-MCNC: 4.6 G/DL (ref 3.2–4.9)
ALBUMIN/GLOB SERPL: 1.5 G/DL
ALP SERPL-CCNC: 56 U/L (ref 30–99)
ALT SERPL-CCNC: 11 U/L (ref 2–50)
ANION GAP SERPL CALC-SCNC: 8 MMOL/L (ref 0–11.9)
APPEARANCE UR: CLEAR
AST SERPL-CCNC: 15 U/L (ref 12–45)
BASOPHILS # BLD AUTO: 0.4 % (ref 0–1.8)
BASOPHILS # BLD: 0.04 K/UL (ref 0–0.12)
BILIRUB SERPL-MCNC: 0.5 MG/DL (ref 0.1–1.5)
BILIRUB UR QL STRIP.AUTO: NEGATIVE
BUN SERPL-MCNC: 11 MG/DL (ref 8–22)
CALCIUM SERPL-MCNC: 9.5 MG/DL (ref 8.5–10.5)
CHLORIDE SERPL-SCNC: 107 MMOL/L (ref 96–112)
CO2 SERPL-SCNC: 20 MMOL/L (ref 20–33)
COLOR UR: YELLOW
CREAT SERPL-MCNC: 0.36 MG/DL (ref 0.5–1.4)
EOSINOPHIL # BLD AUTO: 0.33 K/UL (ref 0–0.51)
EOSINOPHIL NFR BLD: 3.1 % (ref 0–6.9)
ERYTHROCYTE [DISTWIDTH] IN BLOOD BY AUTOMATED COUNT: 41.1 FL (ref 35.9–50)
GLOBULIN SER CALC-MCNC: 3 G/DL (ref 1.9–3.5)
GLUCOSE SERPL-MCNC: 98 MG/DL (ref 65–99)
GLUCOSE UR STRIP.AUTO-MCNC: NEGATIVE MG/DL
HCG SERPL QL: NEGATIVE
HCT VFR BLD AUTO: 42.7 % (ref 37–47)
HGB BLD-MCNC: 14.4 G/DL (ref 12–16)
IMM GRANULOCYTES # BLD AUTO: 0.04 K/UL (ref 0–0.11)
IMM GRANULOCYTES NFR BLD AUTO: 0.4 % (ref 0–0.9)
KETONES UR STRIP.AUTO-MCNC: NEGATIVE MG/DL
LEUKOCYTE ESTERASE UR QL STRIP.AUTO: NEGATIVE
LIPASE SERPL-CCNC: 32 U/L (ref 11–82)
LYMPHOCYTES # BLD AUTO: 2.16 K/UL (ref 1–4.8)
LYMPHOCYTES NFR BLD: 20.3 % (ref 22–41)
MCH RBC QN AUTO: 29.8 PG (ref 27–33)
MCHC RBC AUTO-ENTMCNC: 33.7 G/DL (ref 33.6–35)
MCV RBC AUTO: 88.4 FL (ref 81.4–97.8)
MICRO URNS: NORMAL
MONOCYTES # BLD AUTO: 0.78 K/UL (ref 0–0.85)
MONOCYTES NFR BLD AUTO: 7.3 % (ref 0–13.4)
NEUTROPHILS # BLD AUTO: 7.31 K/UL (ref 2–7.15)
NEUTROPHILS NFR BLD: 68.5 % (ref 44–72)
NITRITE UR QL STRIP.AUTO: NEGATIVE
NRBC # BLD AUTO: 0 K/UL
NRBC BLD-RTO: 0 /100 WBC
PH UR STRIP.AUTO: 7.5 [PH]
PLATELET # BLD AUTO: 375 K/UL (ref 164–446)
PMV BLD AUTO: 10.3 FL (ref 9–12.9)
POTASSIUM SERPL-SCNC: 3.8 MMOL/L (ref 3.6–5.5)
PROT SERPL-MCNC: 7.6 G/DL (ref 6–8.2)
PROT UR QL STRIP: NEGATIVE MG/DL
RBC # BLD AUTO: 4.83 M/UL (ref 4.2–5.4)
RBC UR QL AUTO: NEGATIVE
SODIUM SERPL-SCNC: 135 MMOL/L (ref 135–145)
SP GR UR STRIP.AUTO: 1.01
UROBILINOGEN UR STRIP.AUTO-MCNC: 0.2 MG/DL
WBC # BLD AUTO: 10.7 K/UL (ref 4.8–10.8)

## 2018-05-06 PROCEDURE — 36415 COLL VENOUS BLD VENIPUNCTURE: CPT

## 2018-05-06 PROCEDURE — 85025 COMPLETE CBC W/AUTO DIFF WBC: CPT

## 2018-05-06 PROCEDURE — 80053 COMPREHEN METABOLIC PANEL: CPT

## 2018-05-06 PROCEDURE — 84703 CHORIONIC GONADOTROPIN ASSAY: CPT

## 2018-05-06 PROCEDURE — 99284 EMERGENCY DEPT VISIT MOD MDM: CPT

## 2018-05-06 PROCEDURE — 83690 ASSAY OF LIPASE: CPT

## 2018-05-06 PROCEDURE — 81003 URINALYSIS AUTO W/O SCOPE: CPT

## 2018-05-06 PROCEDURE — 76705 ECHO EXAM OF ABDOMEN: CPT

## 2018-05-06 ASSESSMENT — PAIN SCALES - GENERAL: PAINLEVEL_OUTOF10: 6

## 2018-05-06 NOTE — ED TRIAGE NOTES
Pt to triage , c/o abd pain x 1 month , c/o nausea , c/o dizziness. Pt provided urine cup in triage

## 2018-05-06 NOTE — DISCHARGE INSTRUCTIONS
Abdominal Pain, Women  Abdominal (stomach, pelvic, or belly) pain can be caused by many things. It is important to tell your doctor:  · The location of the pain.  · Does it come and go or is it present all the time?  · Are there things that start the pain (eating certain foods, exercise)?  · Are there other symptoms associated with the pain (fever, nausea, vomiting, diarrhea)?  All of this is helpful to know when trying to find the cause of the pain.  CAUSES   · Stomach: virus or bacteria infection, or ulcer.  · Intestine: appendicitis (inflamed appendix), regional ileitis (Crohn's disease), ulcerative colitis (inflamed colon), irritable bowel syndrome, diverticulitis (inflamed diverticulum of the colon), or cancer of the stomach or intestine.  · Gallbladder disease or stones in the gallbladder.  · Kidney disease, kidney stones, or infection.  · Pancreas infection or cancer.  · Fibromyalgia (pain disorder).  · Diseases of the female organs:  · Uterus: fibroid (non-cancerous) tumors or infection.  · Fallopian tubes: infection or tubal pregnancy.  · Ovary: cysts or tumors.  · Pelvic adhesions (scar tissue).  · Endometriosis (uterus lining tissue growing in the pelvis and on the pelvic organs).  · Pelvic congestion syndrome (female organs filling up with blood just before the menstrual period).  · Pain with the menstrual period.  · Pain with ovulation (producing an egg).  · Pain with an IUD (intrauterine device, birth control) in the uterus.  · Cancer of the female organs.  · Functional pain (pain not caused by a disease, may improve without treatment).  · Psychological pain.  · Depression.  DIAGNOSIS   Your doctor will decide the seriousness of your pain by doing an examination.  · Blood tests.  · X-rays.  · Ultrasound.  · CT scan (computed tomography, special type of X-ray).  · MRI (magnetic resonance imaging).  · Cultures, for infection.  · Barium enema (dye inserted in the large intestine, to better view it with  X-rays).  · Colonoscopy (looking in intestine with a lighted tube).  · Laparoscopy (minor surgery, looking in abdomen with a lighted tube).  · Major abdominal exploratory surgery (looking in abdomen with a large incision).  TREATMENT   The treatment will depend on the cause of the pain.   · Many cases can be observed and treated at home.  · Over-the-counter medicines recommended by your caregiver.  · Prescription medicine.  · Antibiotics, for infection.  · Birth control pills, for painful periods or for ovulation pain.  · Hormone treatment, for endometriosis.  · Nerve blocking injections.  · Physical therapy.  · Antidepressants.  · Counseling with a psychologist or psychiatrist.  · Minor or major surgery.  HOME CARE INSTRUCTIONS   · Do not take laxatives, unless directed by your caregiver.  · Take over-the-counter pain medicine only if ordered by your caregiver. Do not take aspirin because it can cause an upset stomach or bleeding.  · Try a clear liquid diet (broth or water) as ordered by your caregiver. Slowly move to a bland diet, as tolerated, if the pain is related to the stomach or intestine.  · Have a thermometer and take your temperature several times a day, and record it.  · Bed rest and sleep, if it helps the pain.  · Avoid sexual intercourse, if it causes pain.  · Avoid stressful situations.  · Keep your follow-up appointments and tests, as your caregiver orders.  · If the pain does not go away with medicine or surgery, you may try:  · Acupuncture.  · Relaxation exercises (yoga, meditation).  · Group therapy.  · Counseling.  SEEK MEDICAL CARE IF:   · You notice certain foods cause stomach pain.  · Your home care treatment is not helping your pain.  · You need stronger pain medicine.  · You want your IUD removed.  · You feel faint or lightheaded.  · You develop nausea and vomiting.  · You develop a rash.  · You are having side effects or an allergy to your medicine.  SEEK IMMEDIATE MEDICAL CARE IF:   · Your  pain does not go away or gets worse.  · You have a fever.  · Your pain is felt only in portions of the abdomen. The right side could possibly be appendicitis. The left lower portion of the abdomen could be colitis or diverticulitis.  · You are passing blood in your stools (bright red or black tarry stools, with or without vomiting).  · You have blood in your urine.  · You develop chills, with or without a fever.  · You pass out.  MAKE SURE YOU:   · Understand these instructions.  · Will watch your condition.  · Will get help right away if you are not doing well or get worse.  Document Released: 10/14/2008 Document Revised: 03/11/2013 Document Reviewed: 11/04/2010  [employer]® Patient Information ©2014 [employer], [employer].

## 2018-05-06 NOTE — ED PROVIDER NOTES
"ED Provider Note    Scribed for Weston Boudreaux M.D. by Maxim Mathews. 5/6/2018  10:31 AM    Primary care provider: None noted  Means of arrival: Walk in  History obtained from: Patient  History limited by: None    CHIEF COMPLAINT  Chief Complaint   Patient presents with   • Abdominal Pain   • Nausea   • Dizziness       HPI  Paola Covington is a 28 y.o. female who presents to the Emergency Department complaining of epigastric abdominal pain which started approximately one month ago. Patient states she has been having the pain intermittently over the last month. Episodes would last a couple of hours at a time. She describes the pain as \"a really bad hunger pain.\" Patient notes having worsened severity of pain last night, prompting her to visit the ED today. Patient notes her most recent episode of pain prior to exam started a couple of hours after eating. She otherwise denies any definite association of the pain with food. The pain is now starting to ease at the time of exam. Patient reports associated nausea, burping, and lightheadedness with episodes of abdominal pain. She denies any vomiting. Patient has no prior abdominal surgeries.       REVIEW OF SYSTEMS  Pertinent positives include epigastric abdominal pain, nausea, burping, lightheadedness.   Pertinent negatives include no vomiting.    All other systems reviewed and negative.  C      PAST MEDICAL HISTORY   has a past medical history of Anxiety and ASTHMA.    SURGICAL HISTORY   has a past surgical history that includes dilation and evacuation (7/16/2012) and dilation and evacuation (2/23/2018).    SOCIAL HISTORY  Social History   Substance Use Topics   • Smoking status: Never Smoker   • Alcohol use Yes      Comment: occ      History   Drug Use No       FAMILY HISTORY  Family History   Problem Relation Age of Onset   • Hypertension Maternal Grandmother      meds   • Arthritis Maternal Grandmother        CURRENT MEDICATIONS  Home Medications     " "Reviewed by Whitney Romero R.N. (Registered Nurse) on 05/06/18 at 1000  Med List Status: Complete   Medication Last Dose Status   Prenatal MV-Min-Fe Fum-FA-DHA (PRENATAL 1 PO) 2/22/2018 Active                ALLERGIES  No Known Allergies    PHYSICAL EXAM  VITAL SIGNS: /79   Pulse 70   Temp 37.3 °C (99.1 °F)   Resp 16   Ht 1.499 m (4' 11\")   Wt 69.5 kg (153 lb 3.5 oz)   LMP 04/29/2018 (Approximate)   SpO2 91%   Breastfeeding? No   BMI 30.95 kg/m²   Nursing note and vitals reviewed.  Constitutional: Well-developed and well-nourished. No distress.   HENT: Head is normocephalic and atraumatic. Oropharynx is clear and moist without exudate or erythema.   Eyes: Pupils are equal, round, and reactive to light. Conjunctiva are normal.   Cardiovascular: Normal rate and regular rhythm. No murmur heard. Normal radial pulses.  Pulmonary/Chest: Breath sounds normal. No wheezes or rales.   Abdominal: Soft. Mild tenderness to epigastrium. Non-distended. Normal active bowel sounds. No guarding or peritoneal signs. No palpable abdominal aortic aneurysm. No masses. No tenderness at McBurney's point. Negative Rojo's  Musculoskeletal: Extremities exhibit normal range of motion without edema or tenderness.   Neurological: Awake, alert and oriented to person, place, and time. No focal deficits noted.  Skin: Skin is warm and dry. No rash.  Psychiatric: Normal mood and affect. Appropriate for clinical situation       DIAGNOSTIC STUDIES / PROCEDURES    LABS  Results for orders placed or performed during the hospital encounter of 05/06/18   CBC WITH DIFFERENTIAL   Result Value Ref Range    WBC 10.7 4.8 - 10.8 K/uL    RBC 4.83 4.20 - 5.40 M/uL    Hemoglobin 14.4 12.0 - 16.0 g/dL    Hematocrit 42.7 37.0 - 47.0 %    MCV 88.4 81.4 - 97.8 fL    MCH 29.8 27.0 - 33.0 pg    MCHC 33.7 33.6 - 35.0 g/dL    RDW 41.1 35.9 - 50.0 fL    Platelet Count 375 164 - 446 K/uL    MPV 10.3 9.0 - 12.9 fL    Neutrophils-Polys 68.50 44.00 - 72.00 " %    Lymphocytes 20.30 (L) 22.00 - 41.00 %    Monocytes 7.30 0.00 - 13.40 %    Eosinophils 3.10 0.00 - 6.90 %    Basophils 0.40 0.00 - 1.80 %    Immature Granulocytes 0.40 0.00 - 0.90 %    Nucleated RBC 0.00 /100 WBC    Neutrophils (Absolute) 7.31 (H) 2.00 - 7.15 K/uL    Lymphs (Absolute) 2.16 1.00 - 4.80 K/uL    Monos (Absolute) 0.78 0.00 - 0.85 K/uL    Eos (Absolute) 0.33 0.00 - 0.51 K/uL    Baso (Absolute) 0.04 0.00 - 0.12 K/uL    Immature Granulocytes (abs) 0.04 0.00 - 0.11 K/uL    NRBC (Absolute) 0.00 K/uL   COMP METABOLIC PANEL   Result Value Ref Range    Sodium 135 135 - 145 mmol/L    Potassium 3.8 3.6 - 5.5 mmol/L    Chloride 107 96 - 112 mmol/L    Co2 20 20 - 33 mmol/L    Anion Gap 8.0 0.0 - 11.9    Glucose 98 65 - 99 mg/dL    Bun 11 8 - 22 mg/dL    Creatinine 0.36 (L) 0.50 - 1.40 mg/dL    Calcium 9.5 8.5 - 10.5 mg/dL    AST(SGOT) 15 12 - 45 U/L    ALT(SGPT) 11 2 - 50 U/L    Alkaline Phosphatase 56 30 - 99 U/L    Total Bilirubin 0.5 0.1 - 1.5 mg/dL    Albumin 4.6 3.2 - 4.9 g/dL    Total Protein 7.6 6.0 - 8.2 g/dL    Globulin 3.0 1.9 - 3.5 g/dL    A-G Ratio 1.5 g/dL   LIPASE   Result Value Ref Range    Lipase 32 11 - 82 U/L   URINALYSIS (UA)   Result Value Ref Range    Color Yellow     Character Clear     Specific Gravity 1.009 <1.035    Ph 7.5 5.0 - 8.0    Glucose Negative Negative mg/dL    Ketones Negative Negative mg/dL    Protein Negative Negative mg/dL    Bilirubin Negative Negative    Urobilinogen, Urine 0.2 Negative    Nitrite Negative Negative    Leukocyte Esterase Negative Negative    Occult Blood Negative Negative    Micro Urine Req see below    HCG QUAL SERUM   Result Value Ref Range    Beta-Hcg Qualitative Serum Negative Negative   ESTIMATED GFR   Result Value Ref Range    GFR If African American >60 >60 mL/min/1.73 m 2    GFR If Non African American >60 >60 mL/min/1.73 m 2      All labs reviewed by me.    RADIOLOGY  US-GALLBLADDER   Final Result      Negative gallbladder ultrasound.            The radiologist's interpretation of all radiological studies have been reviewed by me.    COURSE & MEDICAL DECISION MAKING  Nursing notes, VS, PMSFHx reviewed in chart.     Review of past medical records shows the patient was last here for DNE February 2018.     10:31 AM - Patient seen and examined at bedside. Ordered US gallbladder, UA, HCG qual serum, CBC, CMP, lipase, estimated GFR to evaluate her symptoms. The differential diagnoses include but are not limited to: viral syndrome, gastritis, cholelithiasis, pancreatitis.     11:59 AM the patient presents today with epigastric abdominal pain.  This been fairly intermittent.  She has a benign abdominal exam.  Laboratory studies are remarkable for normal white blood cell count.  Normal LFTs.  Lipase is normal ruling out pancreatitis.  Pregnancy test is negative ruling out ectopic pregnancy.  There is no evidence of urinary tract infection.  Ultrasound was obtained in order to evaluate for cholelithiasis.  Ultrasound did not reveal any evidence of gallstones.    The patient's workup is reassuring.  At this time I feel she is stable to be discharged home.  Return precautions are discussed.  Patient is discharged home in stable condition.    The patient will return for new or worsening symptoms and is stable at the time of discharge.    The patient is referred to a primary physician for blood pressure management, diabetic screening, and for all other preventative health concerns.    DISPOSITION:  Patient will be discharged home in stable condition.    FOLLOW UP:  [facility], Emergency Dept  [address] [address] [phone]    If symptoms worsen    Your Physician  Varies    Schedule an appointment as soon as possible for a visit        OUTPATIENT MEDICATIONS:  New Prescriptions    No medications on file         FINAL IMPRESSION  1. Epigastric pain          Maxim NICOLE (Scribsimone), homero scribing for, and in the presence  ofWeston M.D..    Electronically signed by: Maxim Mathews (Scribe), 5/6/2018    I, Weston Boudreaux M.D. personally performed the services described in this documentation, as scribed by Maxim Mathews in my presence, and it is both accurate and complete.    The note accurately reflects work and decisions made by me.  Weston Boudreaux  5/6/2018  12:00 PM

## (undated) DEVICE — CATHETER IV NON-SAFETY 18 GA X 1 1/4 (50/BX 4BX/CA)

## (undated) DEVICE — SOLUTION 10%PVP-IODINE 8OZ - (24/CA)

## (undated) DEVICE — SET EXTENSION WITH 2 PORTS (48EA/CA) ***PART #2C8610 IS A SUBSTITUTE*****

## (undated) DEVICE — DETERGENT RENUZYME PLUS 10 OZ PACKET (50/BX)

## (undated) DEVICE — WATER IRRIG. STER. 1500 ML - (9/CA)

## (undated) DEVICE — TRAY SRGPRP PVP IOD WT PRP - (20/CA)

## (undated) DEVICE — HEAD HOLDER JUNIOR/ADULT

## (undated) DEVICE — TUBING CLEARLINK DUO-VENT - C-FLO (48EA/CA)

## (undated) DEVICE — PACK DELIVERY ROOM (7EA/CA)

## (undated) DEVICE — KIT  I.V. START (100EA/CA)